# Patient Record
Sex: FEMALE | NOT HISPANIC OR LATINO | Employment: OTHER | URBAN - METROPOLITAN AREA
[De-identification: names, ages, dates, MRNs, and addresses within clinical notes are randomized per-mention and may not be internally consistent; named-entity substitution may affect disease eponyms.]

---

## 2018-11-15 ENCOUNTER — TELEPHONE (OUTPATIENT)
Dept: OTOLARYNGOLOGY | Facility: OTHER | Age: 63
End: 2018-11-15

## 2018-11-15 NOTE — TELEPHONE ENCOUNTER
Reason for Call:  Same Day Appointment, Requested Provider:  Wesly Smith MD     PCP: No primary care provider on file.    Reason for visit:   Patient is having issues with her throat and soreness.  She states she has been on allergy medication and that is not helping.  She said its not a sore throat per say, but down further in her throat.  She is scheduled for 12-12-18 in [place] but would like to get in sooner.  Please advise.  Thank you    Duration of symptoms: ongoing    Have you been treated for this in the past? No    Additional comments:  none    Can we leave a detailed message on this number? YES    Phone number patient can be reached at: Cell number on file:    Telephone Information:   Mobile [phone]       Best Time: any    Call taken on 11/15/2018 at 12:06 PM by Marie Finn

## 2018-11-16 ENCOUNTER — TELEPHONE (OUTPATIENT)
Dept: OTHER | Facility: CLINIC | Age: 63
End: 2018-11-16

## 2018-12-12 ENCOUNTER — OFFICE VISIT (OUTPATIENT)
Dept: OTOLARYNGOLOGY | Facility: OTHER | Age: 63
End: 2018-12-12
Payer: COMMERCIAL

## 2018-12-12 VITALS
DIASTOLIC BLOOD PRESSURE: 87 MMHG | SYSTOLIC BLOOD PRESSURE: 128 MMHG | HEART RATE: 77 BPM | OXYGEN SATURATION: 98 % | WEIGHT: 175 LBS

## 2018-12-12 DIAGNOSIS — J30.89 SEASONAL ALLERGIC RHINITIS DUE TO OTHER ALLERGIC TRIGGER: Primary | ICD-10-CM

## 2018-12-12 PROCEDURE — 99214 OFFICE O/P EST MOD 30 MIN: CPT | Performed by: OTOLARYNGOLOGY

## 2018-12-12 RX ORDER — CETIRIZINE HYDROCHLORIDE 10 MG/1
10 TABLET ORAL DAILY
Qty: 90 TABLET | Refills: 3 | Status: SHIPPED | OUTPATIENT
Start: 2018-12-12 | End: 2019-12-12

## 2018-12-12 RX ORDER — MONTELUKAST SODIUM 10 MG/1
10 TABLET ORAL AT BEDTIME
Qty: 90 TABLET | Refills: 3 | Status: SHIPPED | OUTPATIENT
Start: 2018-12-12 | End: 2023-08-02

## 2018-12-12 RX ORDER — MONTELUKAST SODIUM 10 MG/1
10 TABLET ORAL AT BEDTIME
Qty: 30 TABLET | Refills: 4 | Status: CANCELLED | OUTPATIENT
Start: 2018-12-12 | End: 2019-12-12

## 2018-12-12 NOTE — LETTER
12/12/2018         RE: Annel Herring  [id number]  [facility] [address]        Dear Colleague,    Thank you for referring your patient, Annel Herring, to the [facility]. Please see a copy of my visit note below.    ENT Consultation    Annel Herring is a 63 year old female who is seen in consultation that is self referred.      History of Present Illness - Annel Herirng is a 63 year old female with nasal sores, chronic sinusitis. Patient reports that she gets sinusitis in spring and fall with a sore throat and bronchitis secondary. She has a lot of significant allergies and allergy triggers. Patient says her sinusitis episodes have improved after septoplasty and SMR of turbinates. Nasal steroid sprays dry her out even more than what she already is.       There is no height or weight on file to calculate BMI.    BP Readings from Last 1 Encounters:   12/12/18 128/87     BP noted to be well controlled today in office.     Annel IS NOT a smoker/uses chewing tobacco.   Past Medical History - No past medical history on file.    Current Medications -   Current Outpatient Medications:      doxycycline, Rosacea, (ORACEA) 40 MG CPDR, Take 40 mg by mouth daily, Disp: , Rfl:      loratadine (CLARITIN) 10 MG tablet, Take 10 mg by mouth daily, Disp: , Rfl:      meclizine (ANTIVERT) 25 MG tablet, Take 25 mg by mouth daily, Disp: , Rfl:     Allergies -   Allergies   Allergen Reactions     Sulfa Drugs Itching     Dmso [Dimethyl Sulfoxide] Rash       Social History -   Social History     Socioeconomic History     Marital status:      Spouse name: Not on file     Number of children: Not on file     Years of education: Not on file     Highest education level: Not on file   Social Needs     Financial resource strain: Not on file     Food insecurity - worry: Not on file     Food insecurity - inability: Not on file     Transportation needs - medical: Not on file      Transportation needs - non-medical: Not on file   Occupational History     Not on file   Tobacco Use     Smoking status: Never Smoker     Smokeless tobacco: Never Used   Substance and Sexual Activity     Alcohol use: Not on file     Drug use: Not on file     Sexual activity: Not on file   Other Topics Concern     Parent/sibling w/ CABG, MI or angioplasty before 65F 55M? Not Asked   Social History Narrative     Not on file       Family History -   Family History   Problem Relation Age of Onset     Cancer No family hx of         no skin cancer       Review of Systems - As per HPI and PMHx, otherwise review of system review of the head and neck negative Otherwise 10+ review of systems is negative.    Physical Exam  /87   Pulse 77   Wt 79.4 kg (175 lb)   SpO2 98%   BMI: There is no height or weight on file to calculate BMI.    General - The patient is well nourished and well developed, and appears to have good nutritional status.  Alert and oriented to person and place, answers questions and cooperates with examination appropriately.    SKIN - No suspicious lesions or rashes.  Respiration - No respiratory distress.  Head and Face - Normocephalic and atraumatic, with no gross asymmetry noted of the contour of the facial features.  The facial nerve is intact, with strong symmetric movements.    Voice and Breathing - The patient was breathing comfortably without the use of accessory muscles. The patients voice was clear and strong, and had appropriate pitch and quality.    Ears - Bilateral pinna and EACs with normal appearing overlying skin. Tympanic membrane intact with good mobility on pneumatic otoscopy bilaterally. Bony landmarks of the ossicular chain are normal. The tympanic membranes are normal in appearance. No retraction, perforation, or masses.  No fluid or purulence was seen in the external canal or the middle ear.     Eyes - Extraocular movements intact.  Sclera were not icteric or injected,  conjunctiva were pink and moist.    Mouth - Examination of the oral cavity showed pink, healthy oral mucosa. No lesions or ulcerations noted.  The tongue was mobile and midline, and the dentition were in good condition.      Throat - The walls of the oropharynx were smooth, pink, moist, symmetric, and had no lesions or ulcerations.  The tonsillar pillars and soft palate were symmetric.  The uvula was midline on elevation.    Neck - Normal midline excursion of the laryngotracheal complex during swallowing.  Full range of motion on passive movement.  Palpation of the occipital, submental, submandibular, internal jugular chain, and supraclavicular nodes did not demonstrate any abnormal lymph nodes or masses.  The carotid pulse was palpable bilaterally.  Palpation of the thyroid was soft and smooth, with no nodules or goiter appreciated.  The trachea was mobile and midline.    Nose - External contour is symmetric, no gross deflection or scars.  Nasal mucosa is pink and moist with no abnormal mucus.  The septum was midline and non-obstructive, turbinates of normal size and position.  No polyps, masses, or purulence noted on examination.    Neuro - Nonfocal neuro exam is normal, CN 2 through 12 intact, normal gait and muscle tone.    Respiratory-lungs clear to auscultation      Performed in clinic today:  No procedures preformed in clinic today      A/P - Annel Herring is a 63 year old female with seasonal allergic rhinitis secondary bronchitis upper airway reactive disease.  Since the patient can not use nasal sprays, I recommended that she use Zyrtec. If she has any pulmonology symptoms she will be then started on Singulair 10 mg daily.  Certainly if problems continue she has to see a pulmonologist.      This document serves as a record of the services and decisions personally performed and made by Dr. Wesly Smith MD. It was created on his behalf by Gloria Rader, a trained medical scribe. The creation of  this document is based the provider's statements to the medical scribe.  Gloria Rader 12/12/2018    Provider:   The information in this document, created by the medical scribe for me, accurately reflects the services I personally performed and the decisions made by me. I have reviewed and approved this document for accuracy prior to leaving the patient care area.  Dr. Wesly Smith MD 12/12/2018    Wesly mSith MD    Again, thank you for allowing me to participate in the care of your patient.        Sincerely,        Wesly Smith MD, MD

## 2018-12-12 NOTE — PROGRESS NOTES
ENT Consultation    Annel Herring is a 63 year old female who is seen in consultation that is self referred.      History of Present Illness - Annel Herring is a 63 year old female with nasal sores, chronic sinusitis. Patient reports that she gets sinusitis in spring and fall with a sore throat and bronchitis secondary. She has a lot of significant allergies and allergy triggers. Patient says her sinusitis episodes have improved after septoplasty and SMR of turbinates. Nasal steroid sprays dry her out even more than what she already is.       There is no height or weight on file to calculate BMI.    BP Readings from Last 1 Encounters:   12/12/18 128/87     BP noted to be well controlled today in office.     Annel IS NOT a smoker/uses chewing tobacco.   Past Medical History - No past medical history on file.    Current Medications -   Current Outpatient Medications:      doxycycline, Rosacea, (ORACEA) 40 MG CPDR, Take 40 mg by mouth daily, Disp: , Rfl:      loratadine (CLARITIN) 10 MG tablet, Take 10 mg by mouth daily, Disp: , Rfl:      meclizine (ANTIVERT) 25 MG tablet, Take 25 mg by mouth daily, Disp: , Rfl:     Allergies -   Allergies   Allergen Reactions     Sulfa Drugs Itching     Dmso [Dimethyl Sulfoxide] Rash       Social History -   Social History     Socioeconomic History     Marital status:      Spouse name: Not on file     Number of children: Not on file     Years of education: Not on file     Highest education level: Not on file   Social Needs     Financial resource strain: Not on file     Food insecurity - worry: Not on file     Food insecurity - inability: Not on file     Transportation needs - medical: Not on file     Transportation needs - non-medical: Not on file   Occupational History     Not on file   Tobacco Use     Smoking status: Never Smoker     Smokeless tobacco: Never Used   Substance and Sexual Activity     Alcohol use: Not on file     Drug use: Not on file     Sexual  activity: Not on file   Other Topics Concern     Parent/sibling w/ CABG, MI or angioplasty before 65F 55M? Not Asked   Social History Narrative     Not on file       Family History -   Family History   Problem Relation Age of Onset     Cancer No family hx of         no skin cancer       Review of Systems - As per HPI and PMHx, otherwise review of system review of the head and neck negative Otherwise 10+ review of systems is negative.    Physical Exam  /87   Pulse 77   Wt 79.4 kg (175 lb)   SpO2 98%   BMI: There is no height or weight on file to calculate BMI.    General - The patient is well nourished and well developed, and appears to have good nutritional status.  Alert and oriented to person and place, answers questions and cooperates with examination appropriately.    SKIN - No suspicious lesions or rashes.  Respiration - No respiratory distress.  Head and Face - Normocephalic and atraumatic, with no gross asymmetry noted of the contour of the facial features.  The facial nerve is intact, with strong symmetric movements.    Voice and Breathing - The patient was breathing comfortably without the use of accessory muscles. The patients voice was clear and strong, and had appropriate pitch and quality.    Ears - Bilateral pinna and EACs with normal appearing overlying skin. Tympanic membrane intact with good mobility on pneumatic otoscopy bilaterally. Bony landmarks of the ossicular chain are normal. The tympanic membranes are normal in appearance. No retraction, perforation, or masses.  No fluid or purulence was seen in the external canal or the middle ear.     Eyes - Extraocular movements intact.  Sclera were not icteric or injected, conjunctiva were pink and moist.    Mouth - Examination of the oral cavity showed pink, healthy oral mucosa. No lesions or ulcerations noted.  The tongue was mobile and midline, and the dentition were in good condition.      Throat - The walls of the oropharynx were smooth,  pink, moist, symmetric, and had no lesions or ulcerations.  The tonsillar pillars and soft palate were symmetric.  The uvula was midline on elevation.    Neck - Normal midline excursion of the laryngotracheal complex during swallowing.  Full range of motion on passive movement.  Palpation of the occipital, submental, submandibular, internal jugular chain, and supraclavicular nodes did not demonstrate any abnormal lymph nodes or masses.  The carotid pulse was palpable bilaterally.  Palpation of the thyroid was soft and smooth, with no nodules or goiter appreciated.  The trachea was mobile and midline.    Nose - External contour is symmetric, no gross deflection or scars.  Nasal mucosa is pink and moist with no abnormal mucus.  The septum was midline and non-obstructive, turbinates of normal size and position.  No polyps, masses, or purulence noted on examination.    Neuro - Nonfocal neuro exam is normal, CN 2 through 12 intact, normal gait and muscle tone.    Respiratory-lungs clear to auscultation      Performed in clinic today:  No procedures preformed in clinic today      A/P - Annel Herring is a 63 year old female with seasonal allergic rhinitis secondary bronchitis upper airway reactive disease.  Since the patient can not use nasal sprays, I recommended that she use Zyrtec. If she has any pulmonology symptoms she will be then started on Singulair 10 mg daily.  Certainly if problems continue she has to see a pulmonologist.      This document serves as a record of the services and decisions personally performed and made by Dr. Wesly Smith MD. It was created on his behalf by Gloria Rader, a trained medical scribe. The creation of this document is based the provider's statements to the medical scribe.  Gloria Rader 12/12/2018    Provider:   The information in this document, created by the medical scribe for me, accurately reflects the services I personally performed and the decisions made by me. I  have reviewed and approved this document for accuracy prior to leaving the patient care area.  Dr. Wesly Smith MD 12/12/2018    Wesly Smith MD

## 2023-01-13 NOTE — PROGRESS NOTES
ENT Consultation    Annel Herring who is a 67 year old female seen in consultation at the request of self.      History of Present Illness - Annel Herring is a 67 year old female presents after 4 years of not being seen.  Or 4 years ago had septoplasty submucous resection of turbinates.  Was breathing well through her nose.  However in November started having a lot of facial pressure and the neck spasms received amoxicillin for treatment of presumed sinusitis even though did not feel much drainage.  Then soon thereafter had cataract surgery.  Now has again had pressure pressure on the eye some occipital pressure and pain along the area of the nasal bridge.  Gets a lot of secretions mostly clear postnasal not smell or taste.  Sense of smell and taste appear to be intact.  Denies history of migraines.  No photophobia no phonophobia.          BP Readings from Last 1 Encounters:   12/12/18 128/87       BP noted to be well controlled today in office.     Annel IS NOT a smoker/uses chewing tobacco.        Past Medical History - No past medical history on file.    Current Medications -   Current Outpatient Medications:      doxycycline, Rosacea, (ORACEA) 40 MG CPDR, Take 40 mg by mouth daily, Disp: , Rfl:      loratadine (CLARITIN) 10 MG tablet, Take 10 mg by mouth daily, Disp: , Rfl:      meclizine (ANTIVERT) 25 MG tablet, Take 25 mg by mouth daily, Disp: , Rfl:      montelukast (SINGULAIR) 10 MG tablet, Take 1 tablet (10 mg) by mouth At Bedtime, Disp: 90 tablet, Rfl: 3    Allergies -   Allergies   Allergen Reactions     Sulfa Drugs Itching     Dmso [Dimethyl Sulfoxide] Rash       Social History -   Social History     Socioeconomic History     Marital status:    Tobacco Use     Smoking status: Never     Smokeless tobacco: Never       Family History -   Family History   Problem Relation Age of Onset     Cancer No family hx of         no skin cancer       Review of Systems - As per HPI and PMHx,  otherwise review of system review of the head and neck negative. Otherwise 10+ review of system is negative    Physical Exam  There were no vitals taken for this visit.  BMI: There is no height or weight on file to calculate BMI.    General - The patient is well nourished and well developed, and appears to have good nutritional status.  Alert and oriented to person and place, answers questions and cooperates with examination appropriately.    SKIN - No suspicious lesions or rashes.  Respiration - No respiratory distress.  Head and Face - Normocephalic and atraumatic, with no gross asymmetry noted of the contour of the facial features.  The facial nerve is intact, with strong symmetric movements.    Voice and Breathing - The patient was breathing comfortably without the use of accessory muscles. The patients voice was clear and strong, and had appropriate pitch and quality.    Ears - Bilateral pinna and EACs with normal appearing overlying skin. Tympanic membrane intact with good mobility on pneumatic otoscopy bilaterally. Bony landmarks of the ossicular chain are normal. The tympanic membranes are normal in appearance. No retraction, perforation, or masses.  No fluid or purulence was seen in the external canal or the middle ear.     Eyes - Extraocular movements intact.  Sclera were not icteric or injected, conjunctiva were pink and moist.    Mouth - Examination of the oral cavity showed pink, healthy oral mucosa. No lesions or ulcerations noted.  The tongue was mobile and midline, and the dentition were in good condition.      Throat - The walls of the oropharynx were smooth, pink, moist, symmetric, and had no lesions or ulcerations.  The tonsillar pillars and soft palate were symmetric.  The uvula was midline on elevation.    Neck - Normal midline excursion of the laryngotracheal complex during swallowing.  Full range of motion on passive movement.  Palpation of the occipital, submental, submandibular, internal  jugular chain, and supraclavicular nodes did not demonstrate any abnormal lymph nodes or masses.  The carotid pulse was palpable bilaterally.  Palpation of the thyroid was soft and smooth, with no nodules or goiter appreciated.  The trachea was mobile and midline.    Nose - External contour is symmetric, no gross deflection or scars.  Nasal mucosa is pink and moist with no abnormal mucus.  The septum was midline and non-obstructive, turbinates of normal size and position.  No polyps, masses, or purulence noted on examination.    Neuro - Nonfocal neuro exam is normal, CN 2 through 12 intact, normal gait and muscle tone.      Performed in clinic today:  No procedures preformed in clinic today      A/P - Annel Herring is a 67 year old female with what appears to be more of a chronic myofascial pain possibly some chronic sinusitis contributing to that.  At this point we will get a CT scan of the sinuses to further evaluate that area.  Would also like to consult neurology for further evaluation to make sure that indeed is a variant of migraine no other chronic myofascial pain.  Also for inflammation since nonsteroidal anti-inflammatories helped just do not address the pain for for more than couple of hours we will start the patient on ketorolac for 6 days.  Local heat also advised.  Patient will follow-up in 8 weeks.      Wesly Smith MD

## 2023-01-18 ENCOUNTER — OFFICE VISIT (OUTPATIENT)
Dept: OTOLARYNGOLOGY | Facility: OTHER | Age: 68
End: 2023-01-18
Payer: COMMERCIAL

## 2023-01-18 VITALS
WEIGHT: 192.3 LBS | HEIGHT: 66 IN | DIASTOLIC BLOOD PRESSURE: 74 MMHG | TEMPERATURE: 97.6 F | BODY MASS INDEX: 30.91 KG/M2 | SYSTOLIC BLOOD PRESSURE: 120 MMHG

## 2023-01-18 DIAGNOSIS — M79.18 CHRONIC MYOFASCIAL PAIN: Primary | ICD-10-CM

## 2023-01-18 DIAGNOSIS — J32.8 OTHER CHRONIC SINUSITIS: ICD-10-CM

## 2023-01-18 DIAGNOSIS — G89.29 CHRONIC MYOFASCIAL PAIN: Primary | ICD-10-CM

## 2023-01-18 PROCEDURE — 99204 OFFICE O/P NEW MOD 45 MIN: CPT | Performed by: OTOLARYNGOLOGY

## 2023-01-18 RX ORDER — KETOROLAC TROMETHAMINE 10 MG/1
10 TABLET, FILM COATED ORAL EVERY 6 HOURS PRN
Qty: 20 TABLET | Refills: 0 | Status: SHIPPED | OUTPATIENT
Start: 2023-01-18 | End: 2023-03-22

## 2023-01-18 ASSESSMENT — PAIN SCALES - GENERAL: PAINLEVEL: EXTREME PAIN (8)

## 2023-01-18 NOTE — LETTER
1/18/2023         RE: Annel Herring  [id number]  [facility] [address]        Dear Colleague,    Thank you for referring your patient, Annel Herring, to the [facility]. Please see a copy of my visit note below.    ENT Consultation    Annel Herring who is a 67 year old female seen in consultation at the request of self.      History of Present Illness - Annel Herring is a 67 year old female presents after 4 years of not being seen.  Or 4 years ago had septoplasty submucous resection of turbinates.  Was breathing well through her nose.  However in November started having a lot of facial pressure and the neck spasms received amoxicillin for treatment of presumed sinusitis even though did not feel much drainage.  Then soon thereafter had cataract surgery.  Now has again had pressure pressure on the eye some occipital pressure and pain along the area of the nasal bridge.  Gets a lot of secretions mostly clear postnasal not smell or taste.  Sense of smell and taste appear to be intact.  Denies history of migraines.  No photophobia no phonophobia.          BP Readings from Last 1 Encounters:   12/12/18 128/87       BP noted to be well controlled today in office.     Annel IS NOT a smoker/uses chewing tobacco.        Past Medical History - No past medical history on file.    Current Medications -   Current Outpatient Medications:      doxycycline, Rosacea, (ORACEA) 40 MG CPDR, Take 40 mg by mouth daily, Disp: , Rfl:      loratadine (CLARITIN) 10 MG tablet, Take 10 mg by mouth daily, Disp: , Rfl:      meclizine (ANTIVERT) 25 MG tablet, Take 25 mg by mouth daily, Disp: , Rfl:      montelukast (SINGULAIR) 10 MG tablet, Take 1 tablet (10 mg) by mouth At Bedtime, Disp: 90 tablet, Rfl: 3    Allergies -   Allergies   Allergen Reactions     Sulfa Drugs Itching     Dmso [Dimethyl Sulfoxide] Rash       Social History -   Social History     Socioeconomic History      Marital status:    Tobacco Use     Smoking status: Never     Smokeless tobacco: Never       Family History -   Family History   Problem Relation Age of Onset     Cancer No family hx of         no skin cancer       Review of Systems - As per HPI and PMHx, otherwise review of system review of the head and neck negative. Otherwise 10+ review of system is negative    Physical Exam  There were no vitals taken for this visit.  BMI: There is no height or weight on file to calculate BMI.    General - The patient is well nourished and well developed, and appears to have good nutritional status.  Alert and oriented to person and place, answers questions and cooperates with examination appropriately.    SKIN - No suspicious lesions or rashes.  Respiration - No respiratory distress.  Head and Face - Normocephalic and atraumatic, with no gross asymmetry noted of the contour of the facial features.  The facial nerve is intact, with strong symmetric movements.    Voice and Breathing - The patient was breathing comfortably without the use of accessory muscles. The patients voice was clear and strong, and had appropriate pitch and quality.    Ears - Bilateral pinna and EACs with normal appearing overlying skin. Tympanic membrane intact with good mobility on pneumatic otoscopy bilaterally. Bony landmarks of the ossicular chain are normal. The tympanic membranes are normal in appearance. No retraction, perforation, or masses.  No fluid or purulence was seen in the external canal or the middle ear.     Eyes - Extraocular movements intact.  Sclera were not icteric or injected, conjunctiva were pink and moist.    Mouth - Examination of the oral cavity showed pink, healthy oral mucosa. No lesions or ulcerations noted.  The tongue was mobile and midline, and the dentition were in good condition.      Throat - The walls of the oropharynx were smooth, pink, moist, symmetric, and had no lesions or ulcerations.  The tonsillar pillars  and soft palate were symmetric.  The uvula was midline on elevation.    Neck - Normal midline excursion of the laryngotracheal complex during swallowing.  Full range of motion on passive movement.  Palpation of the occipital, submental, submandibular, internal jugular chain, and supraclavicular nodes did not demonstrate any abnormal lymph nodes or masses.  The carotid pulse was palpable bilaterally.  Palpation of the thyroid was soft and smooth, with no nodules or goiter appreciated.  The trachea was mobile and midline.    Nose - External contour is symmetric, no gross deflection or scars.  Nasal mucosa is pink and moist with no abnormal mucus.  The septum was midline and non-obstructive, turbinates of normal size and position.  No polyps, masses, or purulence noted on examination.    Neuro - Nonfocal neuro exam is normal, CN 2 through 12 intact, normal gait and muscle tone.      Performed in clinic today:  No procedures preformed in clinic today      TRE/P - Annel Herring is a 67 year old female with what appears to be more of a chronic myofascial pain possibly some chronic sinusitis contributing to that.  At this point we will get a CT scan of the sinuses to further evaluate that area.  Would also like to consult neurology for further evaluation to make sure that indeed is a variant of migraine no other chronic myofascial pain.  Also for inflammation since nonsteroidal anti-inflammatories helped just do not address the pain for for more than couple of hours we will start the patient on ketorolac for 6 days.  Local heat also advised.  Patient will follow-up in 8 weeks.      Wesly Smith MD      Again, thank you for allowing me to participate in the care of your patient.        Sincerely,        Wesly Smith MD, MD

## 2023-01-20 ENCOUNTER — ANCILLARY PROCEDURE (OUTPATIENT)
Dept: CT IMAGING | Facility: CLINIC | Age: 68
End: 2023-01-20
Attending: OTOLARYNGOLOGY
Payer: COMMERCIAL

## 2023-01-20 DIAGNOSIS — J32.8 OTHER CHRONIC SINUSITIS: ICD-10-CM

## 2023-01-20 PROCEDURE — 70486 CT MAXILLOFACIAL W/O DYE: CPT | Mod: GC | Performed by: RADIOLOGY

## 2023-01-27 ENCOUNTER — TELEPHONE (OUTPATIENT)
Dept: OTOLARYNGOLOGY | Facility: OTHER | Age: 68
End: 2023-01-27
Payer: COMMERCIAL

## 2023-01-27 NOTE — TELEPHONE ENCOUNTER
Test Results        Who ordered the test:   Dr Wesly Smith    Type of test: Lab and CT    Date of test:   01-20-23    Where was the test performed:   [place]    What are your questions/concerns?:   Patient would like the results of CT scan.  She has not heard from anyone yet.  Thank you    Okay to leave a detailed message?: Yes at Cell number on file:    Telephone Information:   Mobile [phone]

## 2023-02-01 NOTE — TELEPHONE ENCOUNTER
Patient returned call, notified of provider's message as written. Patient verbalized understanding and has no further questions at this time.     Jaquelin Andrade RN   [facility]

## 2023-02-01 NOTE — TELEPHONE ENCOUNTER
Attempted to call patient, no answer. Left voicemail and requested patient call back at [phone].       Jaquelin Andrade RN   [facility]
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
## 2023-03-22 ENCOUNTER — OFFICE VISIT (OUTPATIENT)
Dept: OTOLARYNGOLOGY | Facility: OTHER | Age: 68
End: 2023-03-22
Payer: COMMERCIAL

## 2023-03-22 VITALS
DIASTOLIC BLOOD PRESSURE: 78 MMHG | SYSTOLIC BLOOD PRESSURE: 120 MMHG | TEMPERATURE: 95.8 F | WEIGHT: 196 LBS | BODY MASS INDEX: 31.5 KG/M2 | HEIGHT: 66 IN

## 2023-03-22 DIAGNOSIS — M54.2 CERVICALGIA: ICD-10-CM

## 2023-03-22 DIAGNOSIS — J30.9 ALLERGIC RHINITIS, UNSPECIFIED SEASONALITY, UNSPECIFIED TRIGGER: Primary | ICD-10-CM

## 2023-03-22 PROCEDURE — 99213 OFFICE O/P EST LOW 20 MIN: CPT | Performed by: OTOLARYNGOLOGY

## 2023-03-22 RX ORDER — AZELASTINE 1 MG/ML
2 SPRAY, METERED NASAL 2 TIMES DAILY
Qty: 30 ML | Refills: 3 | Status: SHIPPED | OUTPATIENT
Start: 2023-03-22 | End: 2023-04-21

## 2023-03-22 RX ORDER — CETIRIZINE HYDROCHLORIDE 10 MG/1
10 TABLET ORAL DAILY
Qty: 30 TABLET | Refills: 2 | Status: SHIPPED | OUTPATIENT
Start: 2023-03-22

## 2023-03-22 RX ORDER — FLUTICASONE PROPIONATE 50 MCG
2 SPRAY, SUSPENSION (ML) NASAL DAILY
Qty: 16 G | Refills: 3 | Status: SHIPPED | OUTPATIENT
Start: 2023-03-22

## 2023-03-22 ASSESSMENT — PAIN SCALES - GENERAL: PAINLEVEL: NO PAIN (0)
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
## 2023-03-23 ENCOUNTER — THERAPY VISIT (OUTPATIENT)
Dept: PHYSICAL THERAPY | Facility: CLINIC | Age: 68
End: 2023-03-23
Attending: OTOLARYNGOLOGY
Payer: COMMERCIAL

## 2023-03-23 DIAGNOSIS — J30.9 ALLERGIC RHINITIS, UNSPECIFIED SEASONALITY, UNSPECIFIED TRIGGER: ICD-10-CM

## 2023-03-23 DIAGNOSIS — M54.2 CERVICALGIA: ICD-10-CM

## 2023-03-23 PROCEDURE — 97110 THERAPEUTIC EXERCISES: CPT | Mod: GP | Performed by: PHYSICAL THERAPIST

## 2023-03-23 PROCEDURE — 97161 PT EVAL LOW COMPLEX 20 MIN: CPT | Mod: GP | Performed by: PHYSICAL THERAPIST

## 2023-03-23 PROCEDURE — 97140 MANUAL THERAPY 1/> REGIONS: CPT | Mod: GP | Performed by: PHYSICAL THERAPIST

## 2023-03-23 NOTE — PROGRESS NOTES
Physical Therapy Initial Evaluation  Subjective:  The history is provided by the patient.   Therapist Generated HPI Evaluation  Problem details: Patient reports dealing with chest, neck, back and bilateral arm pain and headaches. This started in October in her low back and was given prednisone but after taking meds she started having neck and upper back pain, had recent eye surgery that she is taking a antibiotic for because of infection. .         Type of problem:  Cervical spine and thoracic spine.    Chronicity: 10/10/22.  Condition occurred with:  Insidious onset.  Where condition occurred: for unknown reasons.  Patient reports pain:  Cervical right side, cervical left side, thoracic left side and thoracic right side.  Pain is described as aching and is intermittent.  Pain radiates to:  Shoulder left, shoulder right, upper arm left and upper arm right. Pain is worse during the night.  Since onset symptoms are gradually worsening.  Associated symptoms:  Loss of strength and headache. Exacerbated by: rows, lifting , standing, sleeping at night.  and relieved by analgesics (neck stretches. ).  Special tests included:  CT scan (CT: sinus: no acute sinuitis).    Barriers include:  None as reported by patient.    Patient Health History  Annel Herring being seen for neck / upper back pain.     Problem began: 3/22/2023.   Problem occurred: unsure, started after low back pain    Pain is reported as 8/10 on pain scale.  General health as reported by patient is good.     Red flags:  Severe headaches.   Other medical allergies details: see chart.   Surgeries include:  Other. Other surgery history details: eye surgery.    Current medications:  Pain medication.    Current occupation is retired .   Primary job tasks include:  Prolonged sitting and prolonged standing.   Other job/home tasks details: household tasks. .                                  Objective:  Standing Alignment:    Cervical/Thoracic:  Forward head and  thoracic kyphosis increased  Shoulder/UE:  Rounded shoulders                                  Cervical/Thoracic Evaluation    AROM:  AROM Cervical:    Flexion:          29 deg with throacic pain  Extension:       30 deg central neck pain  Rotation:         Left:     Right:  Side Bend:      Left: 38 deg     Right:  33 deg.         Cervical Myotomes:  normal                      Cervical Dermatomes:  normal                    Cervical Palpation:    Tenderness present at Left:    Rhomboids; Upper Trap; Levator and Erector Spinae  Tenderness not present at Left:   Sternocleidomstoid or Scalenes  Tenderness present at Right:    Rhomboids; Upper Trap; Levator and Erector Spinae  Tenderness not present at Right:      Sternocleidomstoid or Scalenes    Cervical Stability/Joint Clearing:      Left negative at: TLA LAT or VAT    Right negative at:  TLA LAT or VAT  Negative:ALAR Ligament  Spinal Segmental Conclusions:    Level:  Hypo at C6, C7, T1, T2, T4, T5, T3, T6 and T7             Shoulder Evaluation:  ROM:  AROM:    Flexion:  Left:  158    Right:  158    Abduction:  Left: 133   Right:  133      External Rotation:  Left:  65    Right:  65            Extension/Internal Rotation:  Left:  T9    Right:  T9    PROM:  normal                                Strength:    Flexion: Left:5/5 Strong/pain free    Pain:    Right: 5/5  Strong/pain free     Pain:   Extension:  Left: 5/5  Strong/pain free    Pain:    Right: 5/5    Strong/pain free  Pain:  Abduction:  Left: 4/5  Weak/painful  Pain:    Right: 4/5   Weak/painful    Pain:  Adduction:  Right: 5/5   Strong/pain free    Pain:  Internal Rotation:  Left:5/5   Strong/pain free    Pain:    Right: 5/5   Strong/pain free    Pain:  External Rotation:   Left:5/5   Strong/pain free    Pain:   Right:5/5   Strong/pain free    Pain:                                                     [place] Cervical Evaluation        Test Movements:      RET: During: increases  After: no worse    Repeat  RET: During: increases  After: no worse  Mechanical Response: IncROM                          Conclusion: derangement                                           ROS    Assessment/Plan:    Patient is a 68 year old female with cervical complaints.    Patient has the following significant findings with corresponding treatment plan.                Diagnosis 1:  Chronic neck pain  Pain -  hot/cold therapy, US, manual therapy, self management, education, directional preference exercise and home program  Decreased ROM/flexibility - manual therapy, therapeutic exercise, therapeutic activity and home program  Decreased joint mobility - manual therapy, therapeutic exercise, therapeutic activity and home program  Decreased strength - therapeutic exercise, therapeutic activities and home program  Decreased function - therapeutic activities and home program  Impaired posture - neuro re-education, therapeutic activities and home program    Therapy Evaluation Codes:   1) History comprised of:   Personal factors that impact the plan of care:      Time since onset of symptoms.    Comorbidity factors that impact the plan of care are:      None.     Medications impacting care: None.  2) Examination of Body Systems comprised of:   Body structures and functions that impact the plan of care:      Cervical spine.   Activity limitations that impact the plan of care are:      Driving, Dressing, Lifting and Sitting.  3) Clinical presentation characteristics are:   Stable/Uncomplicated.  4) Decision-Making    Low complexity using standardized patient assessment instrument and/or measureable assessment of functional outcome.  Cumulative Therapy Evaluation is: Low complexity.    Previous and current functional limitations:  (See Goal Flow Sheet for this information)    Short term and Long term goals: (See Goal Flow Sheet for this information)     Communication ability:  Patient appears to be able to clearly communicate and understand verbal and  written communication and follow directions correctly.  Treatment Explanation - The following has been discussed with the patient:   RX ordered/plan of care  Anticipated outcomes  Possible risks and side effects  This patient would benefit from PT intervention to resume normal activities.   Rehab potential is good.    Frequency:  1 X week, once daily  Duration:  for 8 weeks  Discharge Plan:  Achieve all LTG.  Independent in home treatment program.  Reach maximal therapeutic benefit.    Please refer to the daily flowsheet for treatment today, total treatment time and time spent performing 1:1 timed codes.

## 2023-03-24 NOTE — PROGRESS NOTES
[facility]    OUTPATIENT Physical Therapy ORTHOPEDIC EVALUATION  PLAN OF TREATMENT FOR OUTPATIENT REHABILITATION  (COMPLETE FOR INITIAL CLAIMS ONLY)  Patient's Last Name, First Name, M.I.  YOB: 1955  Annel Herring       Provider s Name:  [facility]   Medical Record No.  [id number]   Start of Care Date:  03/23/23   Onset Date:   10/15/22   Treatment Diagnosis:  cervicalgia Medical Diagnosis:     Allergic rhinitis, unspecified seasonality, unspecified trigger  Cervicalgia       Goals:     03/23/23 0500   Body Part   Goals listed below are for cervical spine   Goal #1   Goal #1 lifting/carrying   Previous Functional Level No restrictions   Current Functional Level Can lift;an item to shoulder level weighing   Performance level no weight 4/10 PL   STG Target Performance Lift an item to shoulder level weighing   Performance level no weight painfree   Rationale for grocery shopping;for meal preparation;for housework such as laundry, emptying garbage, use of    Due date 04/20/23   LTG Target Performance Lift an item to shoulder level weighing   Performance Level 4lbs painfree   Rationale for grocery shopping;for meal preparation;for housework such as laundry, emptying garbage, use of    Due date 05/18/23         Therapy Frequency:  1 x / week  Predicted Duration of Therapy Intervention:  8 weeks    Brendan Moody, PT                 I CERTIFY THE NEED FOR THESE SERVICES FURNISHED UNDER        THIS PLAN OF TREATMENT AND WHILE UNDER MY CARE     (Physician attestation of this document indicates review and certification of the therapy plan).                     Certification Date From:  03/23/23   Certification Date To:  05/18/23    Referring Provider:  Wesly Smith    Initial Assessment        See Epic Evaluation SOC Date: 03/23/23

## 2023-03-28 ENCOUNTER — THERAPY VISIT (OUTPATIENT)
Dept: PHYSICAL THERAPY | Facility: CLINIC | Age: 68
End: 2023-03-28
Payer: COMMERCIAL

## 2023-03-28 DIAGNOSIS — M54.2 CERVICALGIA: Primary | ICD-10-CM

## 2023-03-28 PROCEDURE — 97140 MANUAL THERAPY 1/> REGIONS: CPT | Mod: GP | Performed by: PHYSICAL THERAPY ASSISTANT

## 2023-03-28 PROCEDURE — 97110 THERAPEUTIC EXERCISES: CPT | Mod: GP | Performed by: PHYSICAL THERAPY ASSISTANT

## 2023-04-04 ENCOUNTER — THERAPY VISIT (OUTPATIENT)
Dept: PHYSICAL THERAPY | Facility: CLINIC | Age: 68
End: 2023-04-04
Payer: COMMERCIAL

## 2023-04-04 DIAGNOSIS — M54.2 CERVICALGIA: Primary | ICD-10-CM

## 2023-04-04 PROCEDURE — 97140 MANUAL THERAPY 1/> REGIONS: CPT | Mod: GP | Performed by: PHYSICAL THERAPY ASSISTANT

## 2023-04-04 PROCEDURE — 97110 THERAPEUTIC EXERCISES: CPT | Mod: GP | Performed by: PHYSICAL THERAPY ASSISTANT

## 2023-04-11 ENCOUNTER — THERAPY VISIT (OUTPATIENT)
Dept: PHYSICAL THERAPY | Facility: CLINIC | Age: 68
End: 2023-04-11
Payer: COMMERCIAL

## 2023-04-11 DIAGNOSIS — M54.2 CERVICALGIA: Primary | ICD-10-CM

## 2023-04-11 PROCEDURE — 97110 THERAPEUTIC EXERCISES: CPT | Mod: GP | Performed by: PHYSICAL THERAPIST

## 2023-04-11 PROCEDURE — 97140 MANUAL THERAPY 1/> REGIONS: CPT | Mod: GP | Performed by: PHYSICAL THERAPIST

## 2023-04-18 ENCOUNTER — THERAPY VISIT (OUTPATIENT)
Dept: PHYSICAL THERAPY | Facility: CLINIC | Age: 68
End: 2023-04-18
Payer: COMMERCIAL

## 2023-04-18 DIAGNOSIS — M54.2 CERVICALGIA: Primary | ICD-10-CM

## 2023-04-18 PROCEDURE — 97110 THERAPEUTIC EXERCISES: CPT | Mod: GP | Performed by: PHYSICAL THERAPIST

## 2023-04-18 PROCEDURE — 97140 MANUAL THERAPY 1/> REGIONS: CPT | Mod: GP | Performed by: PHYSICAL THERAPIST

## 2023-04-25 ENCOUNTER — THERAPY VISIT (OUTPATIENT)
Dept: PHYSICAL THERAPY | Facility: CLINIC | Age: 68
End: 2023-04-25
Payer: COMMERCIAL

## 2023-04-25 DIAGNOSIS — M54.2 CERVICALGIA: Primary | ICD-10-CM

## 2023-04-25 PROCEDURE — 97140 MANUAL THERAPY 1/> REGIONS: CPT | Mod: GP | Performed by: PHYSICAL THERAPY ASSISTANT

## 2023-04-25 PROCEDURE — 97110 THERAPEUTIC EXERCISES: CPT | Mod: GP | Performed by: PHYSICAL THERAPY ASSISTANT

## 2023-04-30 NOTE — TELEPHONE ENCOUNTER
RECORDS RECEIVED FROM:    REASON FOR VISIT: Myofascial Pain   Date of Appt: 7/5/2023   NOTES (FOR ALL VISITS) STATUS DETAILS   OFFICE NOTE from referring provider Chad Smith-1/18/2023   OFFICE NOTE from other specialist     DISCHARGE SUMMARY from hospital     DISCHARGE REPORT from the ER     OPERATIVE REPORT     [facility] (MS ONLY)     EMG     EEG     MEDICATION LIST     IMAGING  (FOR ALL VISITS)     LUMBAR PUNCTURE     MONSERRAT SCAN (MOVEMENT)     ULTRASOUND (CAROTID BILAT) *VASCULAR*     MRI (HEAD, NECK, SPINE)     CT (HEAD, NECK, SPINE) PACS  CT Sinus-1/20/2023

## 2023-05-02 ENCOUNTER — THERAPY VISIT (OUTPATIENT)
Dept: PHYSICAL THERAPY | Facility: CLINIC | Age: 68
End: 2023-05-02
Payer: COMMERCIAL

## 2023-05-02 DIAGNOSIS — M54.2 CERVICALGIA: Primary | ICD-10-CM

## 2023-05-02 PROCEDURE — 97140 MANUAL THERAPY 1/> REGIONS: CPT | Mod: GP | Performed by: PHYSICAL THERAPIST

## 2023-05-02 PROCEDURE — 97110 THERAPEUTIC EXERCISES: CPT | Mod: GP | Performed by: PHYSICAL THERAPIST

## 2023-05-09 ENCOUNTER — THERAPY VISIT (OUTPATIENT)
Dept: PHYSICAL THERAPY | Facility: CLINIC | Age: 68
End: 2023-05-09
Payer: COMMERCIAL

## 2023-05-09 DIAGNOSIS — M54.2 CERVICALGIA: Primary | ICD-10-CM

## 2023-05-09 PROCEDURE — 97110 THERAPEUTIC EXERCISES: CPT | Mod: GP | Performed by: PHYSICAL THERAPIST

## 2023-05-09 PROCEDURE — 97140 MANUAL THERAPY 1/> REGIONS: CPT | Mod: GP | Performed by: PHYSICAL THERAPIST

## 2023-05-09 NOTE — PROGRESS NOTES
Subjective:  HPI  Physical Exam                    Objective:  System    Physical Exam    General     ROS    Assessment/Plan:    PROGRESS  REPORT    Progress reporting period is from 3/23/23 to 5/9/23.       SUBJECTIVE  Patient reports her neck is feeling much better. Now though is dealing with sciatica more then anything. Still upper body getting and little bit of lateral shoulder pain. This helped substantially. Only now getting a pull in her ear and neck.    Current Pain level: 1/10.     Initial Pain level: 6/10.   Changes in function:  Yes (See Goal flowsheet attached for changes in current functional level)  Adverse reaction to treatment or activity: None    OBJECTIVE  Changes noted in objective findings:  Yes,L horiz abd 2/10 PL; CROM Flex 50 with no pain just pull; Ext 46 with mild pain; L SB 38 mild pain R 38 mild pain; L rot 60 with mild pain R rot 52     ASSESSMENT/PLAN  Updated problem list and treatment plan: Diagnosis 1:  Bilateral shoulder pain/ cervicalgia   Pain -  hot/cold therapy, manual therapy, self management, education, directional preference exercise and home program  Decreased ROM/flexibility - manual therapy, therapeutic exercise, therapeutic activity and home program  Decreased function - therapeutic activities and home program  Impaired posture - neuro re-education, therapeutic activities and home program  STG/LTGs have been met or progress has been made towards goals:  Yes (See Goal flow sheet completed today.)  Assessment of Progress: The patient's condition is improving.  Self Management Plans:  Patient has been instructed in a home treatment program.  I have re-evaluated this patient and find that the nature, scope, duration and intensity of the therapy is appropriate for the medical condition of the patient.  Annel continues to require the following intervention to meet STG and LTG's:  PT    Recommendations:  This patient would benefit from continued therapy.     Frequency:  1 X  week, once daily  Duration:  for 2 weeks        Please refer to the daily flowsheet for treatment today, total treatment time and time spent performing 1:1 timed codes.

## 2023-05-16 ENCOUNTER — THERAPY VISIT (OUTPATIENT)
Dept: PHYSICAL THERAPY | Facility: CLINIC | Age: 68
End: 2023-05-16
Payer: COMMERCIAL

## 2023-05-16 DIAGNOSIS — M54.2 CERVICALGIA: Primary | ICD-10-CM

## 2023-05-16 PROCEDURE — 97140 MANUAL THERAPY 1/> REGIONS: CPT | Mod: GP | Performed by: PHYSICAL THERAPIST

## 2023-05-16 PROCEDURE — 97110 THERAPEUTIC EXERCISES: CPT | Mod: GP | Performed by: PHYSICAL THERAPIST

## 2023-05-23 ENCOUNTER — THERAPY VISIT (OUTPATIENT)
Dept: PHYSICAL THERAPY | Facility: CLINIC | Age: 68
End: 2023-05-23
Payer: COMMERCIAL

## 2023-05-23 DIAGNOSIS — M54.2 CERVICALGIA: Primary | ICD-10-CM

## 2023-05-23 PROCEDURE — 97110 THERAPEUTIC EXERCISES: CPT | Mod: GP | Performed by: PHYSICAL THERAPIST

## 2023-05-23 PROCEDURE — 97140 MANUAL THERAPY 1/> REGIONS: CPT | Mod: GP | Performed by: PHYSICAL THERAPIST

## 2023-05-23 NOTE — PROGRESS NOTES
05/23/23 0500   Appointment Info   Signing clinician's name / credentials Brendan Moody DPT   Total/Authorized Visits 10 [employer]   Visits Used 10   Medical Diagnosis Allergic rhinitis, unspecified seasonality, unspecified trigger  Cervicalgia   PT Tx Diagnosis chronic neck pain   Quick Adds Certification   Progress Note/Certification   Start of Care Date 03/23/23   Onset of illness/injury or Date of Surgery 10/10/22   Therapy Frequency 1 x/ week   Predicted Duration 1 visit   Certification date from 05/19/23   Certification date to 05/23/23   Progress Note Due Date 05/18/23   Progress Note Completed Date 03/23/23       Present No   GOALS   PT Goals 2   PT Goal 1   Goal Identifier lifting short   Goal Description lift arm to shoulder level 0/10 PL.   Rationale to maximize safety and independence with performance of ADLs and functional tasks   Goal Progress lift arm to shoulder level 3lbs 2/10 PL   Target Date 04/20/23   Date Met 05/23/23   PT Goal 2   Goal Identifier lifting long   Goal Description lift an item to shoulder level weighting 4lbs painfree   Rationale to maximize safety and independence with performance of ADLs and functional tasks   Goal Progress lift arm to shoulder level 3lbs 2/10 PL   Target Date 05/18/23   Subjective Report   Subjective Report pt reports overall feeling much better. Still will have some issues with stiffness in the night with sleeping.   Objective Measures   Objective Measures Objective Measure 1;Objective Measure 2;Objective Measure 3   Objective Measure 1   Objective Measure Upper extremity strength   Details shrug 5/5, shoulder abd 5/5, flexion 4/5 left arm pain, ER 5/5, IR 5/5, elbow flexion 5/5, elbow ext 5/5, wrist flex 5/5, wrist ext/5/5.   Objective Measure 2   Objective Measure cervical strength   Details SB 5/5 bilaterally, flex 5/5 no pain, ext 5/5 no pain,   Objective Measure 3   Objective Measure CROM   Details flex 60 deg, Ext 55 deg no pain,  left SB 34 deg, right SB 38 deg, bilateral rotation 60 deg.   Treatment Interventions (PT)   Interventions Therapeutic Procedure/Exercise;Manual Therapy   Therapeutic Procedure/Exercise   PTRx Ther Proc 1 Latissimus Dorsi Stretch   PTRx Ther Proc 1 - Details 3x 30 sec.    PTRx Ther Proc 2 Pectoral Stretch on Wall   PTRx Ther Proc 2 - Details 3 x 30 sec.    PTRx Ther Proc 8 Shoulder Rolls   PTRx Ther Proc 8 - Details 2 x 10 black band.     PTRx Ther Proc 9 Scapular Retraction/Depression   PTRx Ther Proc 9 - Details x10;  No loss of ROM; added at home independently    PTRx Ther Proc 10 Upper Cervical/Deep Neck Flexor Strengthening   PTRx Ther Proc 10 - Details 10 x5 sec reviewed with patient to gradually add more reps each time she performs it.    PTRx Ther Proc 11 Prone Arm Raise #2   PTRx Ther Proc 11 - Details x 11 re-educated on adding 1 reps each day.    Therapeutic Procedures: strength, endurance, ROM, flexibillity minutes (39239) 15   Skilled Intervention reveiewed HEP for long term plan   Patient Response/Progress pt understands plan   Manual Therapy   Manual Therapy: Mobilization, MFR, MLD, friction massage minutes (47579) 25   Manual Therapy Manual Therapy 2   Manual Therapy 1 bilateral UT x 15 min   Manual Therapy 1 - Details pt seated   Skilled Intervention manual   Patient Response/Progress reduced pain, increased CROM.   Manual Therapy 2 PA mobilization Grade III x 10 min pt prone   Plan   Home program continue with HEP independently to see if pain remain improved.   Updates to plan of care DC with HEP   Total Session Time   Timed Code Treatment Minutes 40   Total Treatment Time (sum of timed and untimed services) 40       [facility]                                                                                   OUTPATIENT PHYSICAL THERAPY    PLAN OF TREATMENT FOR OUTPATIENT REHABILITATION   Patient's Last Name, First Name, Annel Camarena    Date of Birth:   1955   Provider's Name   [facility]   Medical Record No.  [id number]     Onset Date: 10/10/22  Start of Care Date: 03/23/23     Medical Diagnosis:  Allergic rhinitis, unspecified seasonality, unspecified trigger  Cervicalgia      PT Treatment Diagnosis:  chronic neck pain Plan of Treatment  Frequency/Duration: 1 x/ week/ 1 visit    Certification date from 05/19/23 to 05/23/23         See note for plan of treatment details and functional goals     Brendan Moody, PT                         I CERTIFY THE NEED FOR THESE SERVICES FURNISHED UNDER        THIS PLAN OF TREATMENT AND WHILE UNDER MY CARE     (Physician attestation of this document indicates review and certification of the therapy plan).                Referring Provider:  Wesly Smith      Initial Assessment  See Epic Evaluation- Start of Care Date: 03/23/23            DISCHARGE  Reason for Discharge: Pt is progressing week to week and pain is manageable at this point.     Equipment Issued: weights / theraband    Discharge Plan: Patient to continue home program. Will followup with ENT and ophthalmologist     Referring Provider:  Wesly Smith

## 2023-07-05 ENCOUNTER — PRE VISIT (OUTPATIENT)
Dept: NEUROLOGY | Facility: CLINIC | Age: 68
End: 2023-07-05

## 2023-07-25 NOTE — PROGRESS NOTES
History of Present Illness - Annel Herring is a 68 year old female presenting in clinic today for a recheck on Patient presents with:  Follow Up: Chronic myofascial pain    Patient presents for evaluation of chronic myofascial pain since her CT of the sinus was completely negative.  Patient was referred to physical therapy neurology but was not able to see either.  The pain still persist.  No discharge noted no changes in smell and taste no other stigmata of sinusitis.    Present Symptoms include: post nasal drainage and facial pain/pressure and they are   getting worse .  Annel denies vertigo and sore throat.      Body mass index is 30.51 kg/m .    Weight management plan: Patient was referred to their PCP to discuss a diet and exercise plan.    BP Readings from Last 1 Encounters:   08/02/23 (!) 130/90       Patient declined BP in clinic today    Annel IS NOT a smoker/uses chewing tobacco.        Past Medical History - No past medical history on file.    Current Medications -   Current Outpatient Medications:     cetirizine (ZYRTEC) 10 MG tablet, Take 1 tablet (10 mg) by mouth daily, Disp: 30 tablet, Rfl: 2    meclizine (ANTIVERT) 25 MG tablet, Take 25 mg by mouth daily, Disp: , Rfl:     fluticasone (FLONASE) 50 MCG/ACT nasal spray, Spray 2 sprays into both nostrils daily (Patient not taking: Reported on 8/2/2023), Disp: 16 g, Rfl: 3    Allergies -   Allergies   Allergen Reactions    Sulfa Antibiotics Itching    Dmso [Dimethyl Sulfoxide] Rash       Social History -   Social History     Socioeconomic History    Marital status:    Tobacco Use    Smoking status: Never    Smokeless tobacco: Never       Family History -   Family History   Problem Relation Age of Onset    Cancer No family hx of         no skin cancer       Review of Systems - As per HPI and PMHx, otherwise review of system review of the head and neck negative. Otherwise 10+ review of system is negative    Physical Exam  BP (!) 130/90   " Temp 97.1  F (36.2  C) (Temporal)   Ht 1.676 m (5' 6\")   Wt 85.7 kg (189 lb)   BMI 30.51 kg/m    BMI: Body mass index is 30.51 kg/m .    General - The patient is well nourished and well developed, and appears to have good nutritional status.  Alert and oriented to person and place, answers questions and cooperates with examination appropriately.    SKIN - No suspicious lesions or rashes.  Respiration - No respiratory distress.  Head and Face - Normocephalic and atraumatic, with no gross asymmetry noted of the contour of the facial features.  The facial nerve is intact, with strong symmetric movements.    Voice and Breathing - The patient was breathing comfortably without the use of accessory muscles. The patients voice was clear and strong, and had appropriate pitch and quality.    Ears - Bilateral pinna and EACs with normal appearing overlying skin. Tympanic membrane intact with good mobility on pneumatic otoscopy bilaterally. Bony landmarks of the ossicular chain are normal. The tympanic membranes are normal in appearance. No retraction, perforation, or masses.  No fluid or purulence was seen in the external canal or the middle ear.     Eyes - Extraocular movements intact.  Sclera were not icteric or injected, conjunctiva were pink and moist.    Mouth - Examination of the oral cavity showed pink, healthy oral mucosa. No lesions or ulcerations noted.  The tongue was mobile and midline, and the dentition were in good condition.      Throat - The walls of the oropharynx were smooth, pink, moist, symmetric, and had no lesions or ulcerations.  The tonsillar pillars and soft palate were symmetric. Tonsils are symmetric. The uvula was midline on elevation.    Neck - Normal midline excursion of the laryngotracheal complex during swallowing.  Full range of motion on passive movement.  Palpation of the occipital, submental, submandibular, internal jugular chain, and supraclavicular nodes did not demonstrate any " abnormal lymph nodes or masses.  The carotid pulse was palpable bilaterally.  Palpation of the thyroid was soft and smooth, with no nodules or goiter appreciated.  The trachea was mobile and midline.    Nose - External contour is symmetric, no gross deflection or scars.  Nasal mucosa is pink and moist with no abnormal mucus.  The septum was midline and non-obstructive, turbinates of normal size and position.  No polyps, masses, or purulence noted on examination.    Neuro - Nonfocal neuro exam is normal, CN 2 through 12 intact, normal gait and muscle tone.      Performed in clinic today:  No procedures preformed in clinic today      A/P - Annel Herring is a 68 year old female Patient presents with:  Follow Up: Chronic myofascial pain    Patient with chronic myofascial pain would benefit from further evaluation by expert in the area.  Therefore, refer to the [place] [facility] pain [facility] to address some of those issues.    Annel should follow up as needed.            Wesly Smith MD

## 2023-08-02 ENCOUNTER — OFFICE VISIT (OUTPATIENT)
Dept: OTOLARYNGOLOGY | Facility: OTHER | Age: 68
End: 2023-08-02
Payer: COMMERCIAL

## 2023-08-02 VITALS
TEMPERATURE: 97.1 F | HEIGHT: 66 IN | BODY MASS INDEX: 30.37 KG/M2 | SYSTOLIC BLOOD PRESSURE: 130 MMHG | WEIGHT: 189 LBS | DIASTOLIC BLOOD PRESSURE: 90 MMHG

## 2023-08-02 DIAGNOSIS — M79.18 CHRONIC MYOFASCIAL PAIN: Primary | ICD-10-CM

## 2023-08-02 DIAGNOSIS — G89.29 CHRONIC MYOFASCIAL PAIN: Primary | ICD-10-CM

## 2023-08-02 PROCEDURE — 99213 OFFICE O/P EST LOW 20 MIN: CPT | Performed by: OTOLARYNGOLOGY

## 2023-08-02 ASSESSMENT — PAIN SCALES - GENERAL: PAINLEVEL: MODERATE PAIN (5)

## 2023-08-02 NOTE — LETTER
8/2/2023         RE: Annel Herring  [id number]  [facility] [address]        Dear Colleague,    Thank you for referring your patient, Annel Herring, to the [facility]. Please see a copy of my visit note below.    History of Present Illness - Annel Herring is a 68 year old female presenting in clinic today for a recheck on Patient presents with:  Follow Up: Chronic myofascial pain    Patient presents for evaluation of chronic myofascial pain since her CT of the sinus was completely negative.  Patient was referred to physical therapy neurology but was not able to see either.  The pain still persist.  No discharge noted no changes in smell and taste no other stigmata of sinusitis.    Present Symptoms include: post nasal drainage and facial pain/pressure and they are   getting worse .  Annel denies vertigo and sore throat.      Body mass index is 30.51 kg/m .    Weight management plan: Patient was referred to their PCP to discuss a diet and exercise plan.    BP Readings from Last 1 Encounters:   08/02/23 (!) 130/90       Patient declined BP in clinic today    Annel IS NOT a smoker/uses chewing tobacco.        Past Medical History - No past medical history on file.    Current Medications -   Current Outpatient Medications:      cetirizine (ZYRTEC) 10 MG tablet, Take 1 tablet (10 mg) by mouth daily, Disp: 30 tablet, Rfl: 2     meclizine (ANTIVERT) 25 MG tablet, Take 25 mg by mouth daily, Disp: , Rfl:      fluticasone (FLONASE) 50 MCG/ACT nasal spray, Spray 2 sprays into both nostrils daily (Patient not taking: Reported on 8/2/2023), Disp: 16 g, Rfl: 3    Allergies -   Allergies   Allergen Reactions     Sulfa Antibiotics Itching     Dmso [Dimethyl Sulfoxide] Rash       Social History -   Social History     Socioeconomic History     Marital status:    Tobacco Use     Smoking status: Never     Smokeless tobacco: Never       Family History -   Family History  "  Problem Relation Age of Onset     Cancer No family hx of         no skin cancer       Review of Systems - As per HPI and PMHx, otherwise review of system review of the head and neck negative. Otherwise 10+ review of system is negative    Physical Exam  BP (!) 130/90   Temp 97.1  F (36.2  C) (Temporal)   Ht 1.676 m (5' 6\")   Wt 85.7 kg (189 lb)   BMI 30.51 kg/m    BMI: Body mass index is 30.51 kg/m .    General - The patient is well nourished and well developed, and appears to have good nutritional status.  Alert and oriented to person and place, answers questions and cooperates with examination appropriately.    SKIN - No suspicious lesions or rashes.  Respiration - No respiratory distress.  Head and Face - Normocephalic and atraumatic, with no gross asymmetry noted of the contour of the facial features.  The facial nerve is intact, with strong symmetric movements.    Voice and Breathing - The patient was breathing comfortably without the use of accessory muscles. The patients voice was clear and strong, and had appropriate pitch and quality.    Ears - Bilateral pinna and EACs with normal appearing overlying skin. Tympanic membrane intact with good mobility on pneumatic otoscopy bilaterally. Bony landmarks of the ossicular chain are normal. The tympanic membranes are normal in appearance. No retraction, perforation, or masses.  No fluid or purulence was seen in the external canal or the middle ear.     Eyes - Extraocular movements intact.  Sclera were not icteric or injected, conjunctiva were pink and moist.    Mouth - Examination of the oral cavity showed pink, healthy oral mucosa. No lesions or ulcerations noted.  The tongue was mobile and midline, and the dentition were in good condition.      Throat - The walls of the oropharynx were smooth, pink, moist, symmetric, and had no lesions or ulcerations.  The tonsillar pillars and soft palate were symmetric. Tonsils are symmetric. The uvula was midline on " elevation.    Neck - Normal midline excursion of the laryngotracheal complex during swallowing.  Full range of motion on passive movement.  Palpation of the occipital, submental, submandibular, internal jugular chain, and supraclavicular nodes did not demonstrate any abnormal lymph nodes or masses.  The carotid pulse was palpable bilaterally.  Palpation of the thyroid was soft and smooth, with no nodules or goiter appreciated.  The trachea was mobile and midline.    Nose - External contour is symmetric, no gross deflection or scars.  Nasal mucosa is pink and moist with no abnormal mucus.  The septum was midline and non-obstructive, turbinates of normal size and position.  No polyps, masses, or purulence noted on examination.    Neuro - Nonfocal neuro exam is normal, CN 2 through 12 intact, normal gait and muscle tone.      Performed in clinic today:  No procedures preformed in clinic today      A/P - Annel Herring is a 68 year old female Patient presents with:  Follow Up: Chronic myofascial pain    Patient with chronic myofascial pain would benefit from further evaluation by expert in the area.  Therefore, refer to the [place] [facility] to address some of those issues.    Annel should follow up as needed.            Wesly Smith MD           Again, thank you for allowing me to participate in the care of your patient.        Sincerely,        Wesly Smith MD, MD